# Patient Record
Sex: FEMALE | Race: AMERICAN INDIAN OR ALASKA NATIVE
[De-identification: names, ages, dates, MRNs, and addresses within clinical notes are randomized per-mention and may not be internally consistent; named-entity substitution may affect disease eponyms.]

---

## 2021-10-28 ENCOUNTER — HOSPITAL ENCOUNTER (EMERGENCY)
Dept: HOSPITAL 5 - ED | Age: 36
Discharge: HOME | End: 2021-10-28
Payer: MEDICAID

## 2021-10-28 VITALS — DIASTOLIC BLOOD PRESSURE: 70 MMHG | SYSTOLIC BLOOD PRESSURE: 121 MMHG

## 2021-10-28 DIAGNOSIS — M79.10: Primary | ICD-10-CM

## 2021-10-28 DIAGNOSIS — M54.30: ICD-10-CM

## 2021-10-28 DIAGNOSIS — M54.50: ICD-10-CM

## 2021-10-28 PROCEDURE — 99282 EMERGENCY DEPT VISIT SF MDM: CPT

## 2021-10-28 NOTE — EMERGENCY DEPARTMENT REPORT
ED Back Pain/Injury HPI





- General


Chief Complaint: Back Pain/Injury


Stated Complaint: LOWER BACK PAIN


Time Seen by Provider: 10/28/21 11:34


Source: patient


Limitations: No Limitations





- History of Present Illness


Initial Comments: 





37 yo AA comes to ER with low back pain radiating to LLE. It is worse with 

movement. It started after she started a new job and was lifting heavy items. 





No bladder or bowel incontinence


Pain not waking her from sleep 


No fall or trauma


No saddle parasthesia





Pos straight leg raise on exam 





Ambulatory with no difficulty in triage


MD Complaint: back pain


-: Sudden, days(s)


Similar Symptoms Previously: Yes


Place: work


Radiation: none


Severity: mild


Severity scale (0 -10): 3


Quality: aching


Consistency: intermittent


Improves With: immobilization


Worsens With: movement


Associated Symptoms: denies other symptoms





- Related Data


                                  Previous Rx's











 Medication  Instructions  Recorded  Last Taken  Type


 


Cyclobenzaprine [Flexeril] 10 mg PO TID PRN #10 tablet 10/28/21 Unknown Rx


 


Ibuprofen [Motrin] 800 mg PO Q8HR PRN #30 tablet 10/28/21 Unknown Rx


 


predniSONE [Deltasone] 20 mg PO DAILY #5 tablet 10/28/21 Unknown Rx











                                    Allergies











Allergy/AdvReac Type Severity Reaction Status Date / Time


 


No Known Allergies Allergy   Unverified 03/21/14 15:00














ED Review of Systems


ROS: 


Stated complaint: LOWER BACK PAIN


Other details as noted in HPI





Comment: All other systems reviewed and negative





ED Past Medical Hx





- Past Medical History


Previous Medical History?: No





- Surgical History


Past Surgical History?: No





- Family History


Family history: no significant





- Social History


Smoking Status: Never Smoker


Substance Use Type: None





- Medications


Home Medications: 


                                Home Medications











 Medication  Instructions  Recorded  Confirmed  Last Taken  Type


 


Cyclobenzaprine [Flexeril] 10 mg PO TID PRN #10 tablet 10/28/21  Unknown Rx


 


Ibuprofen [Motrin] 800 mg PO Q8HR PRN #30 tablet 10/28/21  Unknown Rx


 


predniSONE [Deltasone] 20 mg PO DAILY #5 tablet 10/28/21  Unknown Rx














ED Physical Exam





- General


Limitations: No Limitations


General appearance: alert, in no apparent distress





- Head


Head exam: Present: atraumatic, normocephalic





- Eye


Eye exam: Present: normal appearance





- ENT


ENT exam: Present: mucous membranes moist





- Neck


Neck exam: Present: normal inspection





- Respiratory


Respiratory exam: Present: normal lung sounds bilaterally.  Absent: respiratory 

distress





- Cardiovascular


Cardiovascular Exam: Present: regular rate, normal rhythm.  Absent: systolic 

murmur, diastolic murmur, rubs, gallop





- GI/Abdominal


GI/Abdominal exam: Present: soft, normal bowel sounds





- Extremities Exam


Extremities exam: Present: normal inspection





- Back Exam


Back exam: Present: normal inspection





- Neurological Exam


Neurological exam: Present: alert, oriented X3





- Psychiatric


Psychiatric exam: Present: normal affect, normal mood





- Skin


Skin exam: Present: warm, dry, intact, normal color.  Absent: rash





ED Course


                                   Vital Signs











  10/28/21 10/28/21





  11:25 11:45


 


Temperature 98.9 F 


 


Pulse Rate 103 H 


 


Respiratory 16 16





Rate  


 


Blood Pressure 127/76 





[Left]  


 


O2 Sat by Pulse 99 





Oximetry  














ED Medical Decision Making





- Medical Decision Making








                                   Vital Signs











  10/28/21 10/28/21





  11:25 11:45


 


Temperature 98.9 F 


 


Pulse Rate 103 H 


 


Respiratory 16 16





Rate  


 


Blood Pressure 127/76 





[Left]  


 


O2 Sat by Pulse 99 





Oximetry  








hr 90 per provider exam





no dysuria


no fever or chills


no n/v/d 


no abd pain 


no cva tenderness


no saddle parasthesia or cauda equina concerns


no trauma





pain worse with movement


pos left straight leg raise 





medicated in er for pain 





dc home with dc plan of care including pcp follow up. Pt verbalizes 

understanding of plan of care including meds and follow up with work comp since 

this occurred while at work. 





On dc pt ambulatory and in nad





- Differential Diagnosis


musculoskeletal; urinary related


Critical care attestation.: 


If time is entered above; I have spent that time in minutes in the direct care 

of this critically ill patient, excluding procedure time.








ED Disposition


Clinical Impression: 


 Musculoskeletal pain, Back pain, Sciatica





Disposition: 01 HOME / SELF CARE / HOMELESS


Is pt being admited?: No


Does the pt Need Aspirin: No


Condition: Stable


Instructions:  Radicular Pain


Additional Instructions: 


meds as ordered





warm compresses





follow up with pcp or ortho MD


referrals below


you should see them asap since you are missing work 


Prescriptions: 


predniSONE [Deltasone] 20 mg PO DAILY #5 tablet


Cyclobenzaprine [Flexeril] 10 mg PO TID PRN #10 tablet


 PRN Reason: Muscle Spasm


Ibuprofen [Motrin] 800 mg PO Q8HR PRN #30 tablet


 PRN Reason: Pain, Moderate (4-6)


Referrals: 


HAYLIE VIRAMONTES MD [Staff Physician] - 3-5 Days


JUN GARCIA MD [Staff Physician] - 3-5 Days


Forms:  Work/School Release Form(ED)


Time of Disposition: 11:39

## 2022-01-07 ENCOUNTER — HOSPITAL ENCOUNTER (EMERGENCY)
Dept: HOSPITAL 5 - ED | Age: 37
Discharge: LEFT BEFORE DISCHARGE | End: 2022-01-07
Payer: MEDICAID

## 2022-01-07 DIAGNOSIS — H92.09: Primary | ICD-10-CM

## 2022-01-07 DIAGNOSIS — Z53.21: ICD-10-CM
